# Patient Record
Sex: MALE
[De-identification: names, ages, dates, MRNs, and addresses within clinical notes are randomized per-mention and may not be internally consistent; named-entity substitution may affect disease eponyms.]

---

## 2017-10-20 ENCOUNTER — HOSPITAL ENCOUNTER (EMERGENCY)
Dept: HOSPITAL 92 - SCSER | Age: 37
Discharge: HOME | End: 2017-10-20
Payer: MEDICARE

## 2017-10-20 DIAGNOSIS — F41.9: ICD-10-CM

## 2017-10-20 DIAGNOSIS — R11.2: Primary | ICD-10-CM

## 2017-10-20 DIAGNOSIS — I10: ICD-10-CM

## 2017-10-20 DIAGNOSIS — E78.5: ICD-10-CM

## 2017-10-20 PROCEDURE — 93005 ELECTROCARDIOGRAM TRACING: CPT

## 2017-10-20 PROCEDURE — 36416 COLLJ CAPILLARY BLOOD SPEC: CPT

## 2018-01-05 ENCOUNTER — HOSPITAL ENCOUNTER (EMERGENCY)
Dept: HOSPITAL 92 - SCSER | Age: 38
Discharge: HOME | End: 2018-01-05
Payer: MEDICARE

## 2018-01-05 DIAGNOSIS — E78.5: ICD-10-CM

## 2018-01-05 DIAGNOSIS — R00.0: ICD-10-CM

## 2018-01-05 DIAGNOSIS — J11.1: Primary | ICD-10-CM

## 2018-01-05 DIAGNOSIS — F41.9: ICD-10-CM

## 2018-01-05 LAB
BASOPHILS # BLD AUTO: 0.1 THOU/UL (ref 0–0.2)
BASOPHILS NFR BLD AUTO: 0.7 % (ref 0–1)
CK MB SERPL-MCNC: 0.2 NG/ML (ref 0–6.6)
EOSINOPHIL # BLD AUTO: 0 THOU/UL (ref 0–0.7)
EOSINOPHIL NFR BLD AUTO: 0.2 % (ref 0–10)
HGB BLD-MCNC: 12.8 G/DL (ref 14–18)
LYMPHOCYTES # BLD: 0.7 THOU/UL (ref 1.2–3.4)
LYMPHOCYTES NFR BLD AUTO: 7.7 % (ref 21–51)
MCH RBC QN AUTO: 30.1 PG (ref 27–31)
MCV RBC AUTO: 89.4 FL (ref 80–94)
MONOCYTES # BLD AUTO: 0.6 THOU/UL (ref 0.11–0.59)
MONOCYTES NFR BLD AUTO: 7.2 % (ref 0–10)
NEUTROPHILS # BLD AUTO: 7.4 THOU/UL (ref 1.4–6.5)
NEUTROPHILS NFR BLD AUTO: 84.3 % (ref 42–75)
PLATELET # BLD AUTO: 252 THOU/UL (ref 130–400)
RBC # BLD AUTO: 4.25 MILL/UL (ref 4.7–6.1)
TROPONIN I SERPL DL<=0.01 NG/ML-MCNC: (no result) NG/ML (ref ?–0.03)
WBC # BLD AUTO: 8.8 THOU/UL (ref 4.8–10.8)

## 2018-01-05 PROCEDURE — 96376 TX/PRO/DX INJ SAME DRUG ADON: CPT

## 2018-01-05 PROCEDURE — 93005 ELECTROCARDIOGRAM TRACING: CPT

## 2018-01-05 PROCEDURE — 71046 X-RAY EXAM CHEST 2 VIEWS: CPT

## 2018-01-05 PROCEDURE — 36415 COLL VENOUS BLD VENIPUNCTURE: CPT

## 2018-01-05 PROCEDURE — 84484 ASSAY OF TROPONIN QUANT: CPT

## 2018-01-05 PROCEDURE — 82553 CREATINE MB FRACTION: CPT

## 2018-01-05 PROCEDURE — 85025 COMPLETE CBC W/AUTO DIFF WBC: CPT

## 2018-01-05 PROCEDURE — 87804 INFLUENZA ASSAY W/OPTIC: CPT

## 2018-01-05 PROCEDURE — 84443 ASSAY THYROID STIM HORMONE: CPT

## 2018-01-05 PROCEDURE — 83605 ASSAY OF LACTIC ACID: CPT

## 2018-01-05 PROCEDURE — 96361 HYDRATE IV INFUSION ADD-ON: CPT

## 2018-01-05 PROCEDURE — 96374 THER/PROPH/DIAG INJ IV PUSH: CPT

## 2018-01-05 NOTE — RAD
PA AND LATERAL CHEST:

 

Date: 1-5-18 

 

History: Cough, subjective fever and sore throat. Patient also complains of chest pain and headache. 


 

Comparison: 1-23-16

 

FINDINGS: 

Cardiac silhouette and pulmonary vasculature are within normal limits. A linear density overlies the 
right hilum which may be related to either minimal scarring or atelectasis. Lungs are otherwise clear
. There has been no other interval change from the prior exam. 

 

IMPRESSION: 

No acute cardiopulmonary process. 

 

 

POS: AMADEOH

## 2018-02-10 NOTE — EKG
Test Reason : 

Blood Pressure : ***/*** mmHG

Vent. Rate : 122 BPM     Atrial Rate : 122 BPM

   P-R Int : 000 ms          QRS Dur : 082 ms

    QT Int : 406 ms       P-R-T Axes : 000 002 012 degrees

   QTc Int : 578 ms

 

Sinus tachycardia

Nonspecific T wave abnormality

Abnormal ECG

 

Confirmed by SALBADOR LORA ([id number]),  CRAIG BECERRA ([id number]) on 2/10/2018 5:33:02 PM

 

Referred By:             Confirmed By:SALBADOR LORA

## 2022-08-08 ENCOUNTER — HOSPITAL ENCOUNTER (EMERGENCY)
Dept: HOSPITAL 57 - BURERS | Age: 42
Discharge: HOME | End: 2022-08-08
Payer: MEDICARE

## 2022-08-08 DIAGNOSIS — E78.5: ICD-10-CM

## 2022-08-08 DIAGNOSIS — Z79.899: ICD-10-CM

## 2022-08-08 DIAGNOSIS — G51.0: Primary | ICD-10-CM

## 2022-08-08 DIAGNOSIS — F41.9: ICD-10-CM

## 2022-08-08 PROCEDURE — 99283 EMERGENCY DEPT VISIT LOW MDM: CPT
